# Patient Record
Sex: FEMALE | Race: WHITE | NOT HISPANIC OR LATINO | Employment: OTHER | URBAN - METROPOLITAN AREA
[De-identification: names, ages, dates, MRNs, and addresses within clinical notes are randomized per-mention and may not be internally consistent; named-entity substitution may affect disease eponyms.]

---

## 2023-10-23 ENCOUNTER — OFFICE VISIT (OUTPATIENT)
Dept: PRIMARY CARE | Facility: CLINIC | Age: 68
End: 2023-10-23
Payer: MEDICARE

## 2023-10-23 VITALS
HEART RATE: 103 BPM | TEMPERATURE: 98.6 F | SYSTOLIC BLOOD PRESSURE: 132 MMHG | BODY MASS INDEX: 22.4 KG/M2 | WEIGHT: 143 LBS | OXYGEN SATURATION: 96 % | DIASTOLIC BLOOD PRESSURE: 84 MMHG

## 2023-10-23 DIAGNOSIS — N63.21 MASS OF UPPER OUTER QUADRANT OF LEFT BREAST: Primary | ICD-10-CM

## 2023-10-23 PROBLEM — E04.1 THYROID NODULE: Status: ACTIVE | Noted: 2023-10-23

## 2023-10-23 PROBLEM — E06.0 THYROIDITIS, ACUTE: Status: ACTIVE | Noted: 2023-10-23

## 2023-10-23 PROBLEM — R73.03 PREDIABETES: Status: ACTIVE | Noted: 2023-10-23

## 2023-10-23 PROBLEM — E05.90 HYPERTHYROIDISM, SUBCLINICAL: Status: ACTIVE | Noted: 2023-10-23

## 2023-10-23 PROBLEM — R00.2 PALPITATIONS: Status: ACTIVE | Noted: 2023-10-23

## 2023-10-23 PROBLEM — E78.5 HLD (HYPERLIPIDEMIA): Status: ACTIVE | Noted: 2023-10-23

## 2023-10-23 PROCEDURE — 99213 OFFICE O/P EST LOW 20 MIN: CPT | Performed by: STUDENT IN AN ORGANIZED HEALTH CARE EDUCATION/TRAINING PROGRAM

## 2023-10-23 PROCEDURE — 1159F MED LIST DOCD IN RCRD: CPT | Performed by: STUDENT IN AN ORGANIZED HEALTH CARE EDUCATION/TRAINING PROGRAM

## 2023-10-23 PROCEDURE — 1036F TOBACCO NON-USER: CPT | Performed by: STUDENT IN AN ORGANIZED HEALTH CARE EDUCATION/TRAINING PROGRAM

## 2023-10-23 NOTE — PROGRESS NOTES
Subjective   Patient ID: Sharyn Link is a 67 y.o. female who presents for Breast Problem (LEFT BREAST LUMP).  Today she is accompanied by alone.     HPI  Left breast mass  Patient endorses finding a mass in the upper outer quadrant of her left breast about a week ago.    She states she does breast exams intermittently but not regularly.  She is a positive family history of breast cancer of which her mom passed at the age of 50.  States she was better with mammograms in her 50s but has not had them recently.  The mass is not associated with any pain no.  No current outpatient medications on file prior to visit.     No current facility-administered medications on file prior to visit.        Allergies   Allergen Reactions    Sulfamethoxazole Other       Immunization History   Administered Date(s) Administered    Moderna SARS-CoV-2 Vaccination 03/20/2021, 04/20/2021         Review of Systems  All pertinent positive symptoms are included in the history of present illness.  All other systems have been reviewed and are negative and noncontributory to this patient's current ailments.     Objective   /84   Pulse 103   Temp 37 °C (98.6 °F)   Wt 64.9 kg (143 lb)   SpO2 96%   BMI 22.40 kg/m²   BSA: 1.75 meters squared  No visits with results within 1 Month(s) from this visit.   Latest known visit with results is:   Legacy Encounter on 08/31/2022   Component Date Value Ref Range Status    TSH 08/31/2022 0.68  0.44 - 3.98 mIU/L Final    Comment:  TSH testing is performed using different testing    methodology at [facility] than at other    [facility]. Direct result comparisons should    only be made within the same method.         Physical Exam  CONSTITUTIONAL - well nourished, well developed, looks like stated age, in no acute distress, not ill-appearing, and not tired appearing  SKIN - normal skin color and pigmentation, normal skin turgor without rash, lesions, or nodules visualized  HEAD - no  trauma, normocephalic  EYES - normal external exam  CHEST -no distressed breathing, good effort; rubbery approximately 2 cm lump in the upper outer quadrant of the left breast that is movable and nontender.  EXTREMITIES - no edema, no deformities  NEUROLOGICAL - normal balance, normal motor, no ataxia  PSYCHIATRIC - alert, pleasant and cordial, age-appropriate      Assessment/Plan     Left breast mass  Requisition for bilateral mammogram was ordered  An ultrasound of the left breast was also ordered  We will reach out to you with results as they become available.    Thank you for letting us be a part of your care team.  Please call the office if you have further questions or concerns regarding your care

## 2023-11-08 ENCOUNTER — APPOINTMENT (OUTPATIENT)
Dept: RADIOLOGY | Facility: HOSPITAL | Age: 68
End: 2023-11-08
Payer: MEDICARE

## 2023-11-09 ENCOUNTER — ANCILLARY PROCEDURE (OUTPATIENT)
Dept: RADIOLOGY | Facility: HOSPITAL | Age: 68
End: 2023-11-09
Payer: MEDICARE

## 2023-11-09 ENCOUNTER — APPOINTMENT (OUTPATIENT)
Dept: RADIOLOGY | Facility: HOSPITAL | Age: 68
End: 2023-11-09
Payer: MEDICARE

## 2023-11-09 ENCOUNTER — HOSPITAL ENCOUNTER (OUTPATIENT)
Dept: RADIOLOGY | Facility: HOSPITAL | Age: 68
Discharge: HOME | End: 2023-11-09
Payer: MEDICARE

## 2023-11-09 DIAGNOSIS — N63.21 MASS OF UPPER OUTER QUADRANT OF LEFT BREAST: ICD-10-CM

## 2023-11-09 PROCEDURE — 77066 DX MAMMO INCL CAD BI: CPT | Mod: BILATERAL PROCEDURE | Performed by: STUDENT IN AN ORGANIZED HEALTH CARE EDUCATION/TRAINING PROGRAM

## 2023-11-09 PROCEDURE — G0279 TOMOSYNTHESIS, MAMMO: HCPCS | Mod: BILATERAL PROCEDURE | Performed by: STUDENT IN AN ORGANIZED HEALTH CARE EDUCATION/TRAINING PROGRAM

## 2023-11-09 PROCEDURE — 76642 ULTRASOUND BREAST LIMITED: CPT | Mod: 50,59

## 2023-11-09 PROCEDURE — 76983 USE EA ADDL TARGET LESION: CPT

## 2023-11-09 PROCEDURE — 77062 BREAST TOMOSYNTHESIS BI: CPT

## 2023-11-09 PROCEDURE — 76642 ULTRASOUND BREAST LIMITED: CPT | Mod: BILATERAL PROCEDURE | Performed by: STUDENT IN AN ORGANIZED HEALTH CARE EDUCATION/TRAINING PROGRAM

## 2024-08-20 ENCOUNTER — APPOINTMENT (OUTPATIENT)
Dept: PRIMARY CARE | Facility: CLINIC | Age: 69
End: 2024-08-20
Payer: MEDICARE

## 2024-08-20 VITALS
HEART RATE: 82 BPM | TEMPERATURE: 97.9 F | WEIGHT: 148 LBS | DIASTOLIC BLOOD PRESSURE: 80 MMHG | BODY MASS INDEX: 22.43 KG/M2 | SYSTOLIC BLOOD PRESSURE: 136 MMHG | HEIGHT: 68 IN

## 2024-08-20 DIAGNOSIS — Z12.31 BREAST CANCER SCREENING BY MAMMOGRAM: ICD-10-CM

## 2024-08-20 DIAGNOSIS — R00.2 PALPITATIONS: ICD-10-CM

## 2024-08-20 DIAGNOSIS — R92.8 ABNORMAL MAMMOGRAM: ICD-10-CM

## 2024-08-20 DIAGNOSIS — Z00.00 MEDICARE ANNUAL WELLNESS VISIT, SUBSEQUENT: Primary | ICD-10-CM

## 2024-08-20 PROCEDURE — 1170F FXNL STATUS ASSESSED: CPT | Performed by: STUDENT IN AN ORGANIZED HEALTH CARE EDUCATION/TRAINING PROGRAM

## 2024-08-20 PROCEDURE — 1159F MED LIST DOCD IN RCRD: CPT | Performed by: STUDENT IN AN ORGANIZED HEALTH CARE EDUCATION/TRAINING PROGRAM

## 2024-08-20 PROCEDURE — 1036F TOBACCO NON-USER: CPT | Performed by: STUDENT IN AN ORGANIZED HEALTH CARE EDUCATION/TRAINING PROGRAM

## 2024-08-20 PROCEDURE — G0439 PPPS, SUBSEQ VISIT: HCPCS | Performed by: STUDENT IN AN ORGANIZED HEALTH CARE EDUCATION/TRAINING PROGRAM

## 2024-08-20 PROCEDURE — 3008F BODY MASS INDEX DOCD: CPT | Performed by: STUDENT IN AN ORGANIZED HEALTH CARE EDUCATION/TRAINING PROGRAM

## 2024-08-20 ASSESSMENT — ENCOUNTER SYMPTOMS
HEADACHES: 0
FATIGUE: 0
ACTIVITY CHANGE: 0
CONFUSION: 0
DIFFICULTY URINATING: 0
LIGHT-HEADEDNESS: 0
SINUS PAIN: 0
DECREASED CONCENTRATION: 0
FLANK PAIN: 0
CHEST TIGHTNESS: 0
WEAKNESS: 0
TROUBLE SWALLOWING: 0
SHORTNESS OF BREATH: 0
ARTHRALGIAS: 0
ABDOMINAL PAIN: 0
PALPITATIONS: 1
EYE PAIN: 0
DIARRHEA: 0
FEVER: 0
AGITATION: 0
ABDOMINAL DISTENTION: 0
APPETITE CHANGE: 0
WHEEZING: 0
COLOR CHANGE: 0
CONSTIPATION: 0

## 2024-08-20 ASSESSMENT — ACTIVITIES OF DAILY LIVING (ADL)
DRESSING: INDEPENDENT
GROCERY_SHOPPING: INDEPENDENT
MANAGING_FINANCES: INDEPENDENT
DOING_HOUSEWORK: INDEPENDENT
TAKING_MEDICATION: INDEPENDENT
BATHING: INDEPENDENT

## 2024-08-20 ASSESSMENT — PATIENT HEALTH QUESTIONNAIRE - PHQ9
2. FEELING DOWN, DEPRESSED OR HOPELESS: NOT AT ALL
SUM OF ALL RESPONSES TO PHQ9 QUESTIONS 1 AND 2: 0
1. LITTLE INTEREST OR PLEASURE IN DOING THINGS: NOT AT ALL

## 2024-08-20 NOTE — PROGRESS NOTES
Subjective   Reason for Visit: Sharyn Link is an 68 y.o. female here for a Medicare Wellness visit.      Reviewed all medications by prescribing practitioner or clinical pharmacist (such as prescriptions, OTCs, herbal therapies and supplements) and documented in the medical record.    Here for Medicare annual. Patient wants to get labs done because it has been a long time since the last ones were done.    Patient describes a history of occasional heart palpitations in the past that were worked up by Cardiology. They followed with Holter Monitors and recommended metoprolol at that time. Patient denied it working well for her. Follow up was lost, but patient would be agreeable to going back to them. Is not currently on any medications.    Mammogram recommended for follow up from last one in 2023.  Last colonoscopy: 12 years ago, declines further screening at this time  Last pap: At 60 years old and was normal     Immunizations: Got Tdap in 1/2023.  Due for shingles and pneumonia vaccination. Patient was made aware about pharmacy availability.          Patient Care Team:  Phil Huggins MD as PCP - General (Family Medicine)     Review of Systems   Constitutional:  Negative for activity change, appetite change, fatigue and fever.   HENT:  Negative for sinus pain and trouble swallowing.    Eyes:  Negative for pain.   Respiratory:  Negative for chest tightness, shortness of breath and wheezing.    Cardiovascular:  Positive for palpitations. Negative for chest pain and leg swelling.   Gastrointestinal:  Negative for abdominal distention, abdominal pain, constipation and diarrhea.   Endocrine: Negative for cold intolerance and heat intolerance.   Genitourinary:  Negative for difficulty urinating and flank pain.   Musculoskeletal:  Negative for arthralgias and gait problem.   Skin:  Negative for color change.   Neurological:  Negative for syncope, weakness, light-headedness and headaches.   Psychiatric/Behavioral:  Negative for  "agitation, confusion and decreased concentration.        Objective   Vitals:  /80 (BP Location: Right arm)   Pulse 82   Temp 36.6 °C (97.9 °F)   Ht 1.727 m (5' 8\")   Wt 67.1 kg (148 lb)   PF 96 L/min   BMI 22.50 kg/m²       Physical Exam  Constitutional:       General: She is not in acute distress.     Appearance: Normal appearance. She is normal weight. She is not ill-appearing.   HENT:      Head: Normocephalic and atraumatic.      Right Ear: External ear normal.      Left Ear: External ear normal.      Nose: No congestion or rhinorrhea.      Mouth/Throat:      Mouth: Mucous membranes are moist.   Eyes:      General:         Right eye: No discharge.         Left eye: No discharge.      Extraocular Movements: Extraocular movements intact.      Conjunctiva/sclera: Conjunctivae normal.   Cardiovascular:      Rate and Rhythm: Normal rate and regular rhythm.      Pulses: Normal pulses.      Heart sounds: Normal heart sounds.   Pulmonary:      Effort: Pulmonary effort is normal.      Breath sounds: Normal breath sounds.   Abdominal:      Palpations: Abdomen is soft.      Tenderness: There is no abdominal tenderness.   Musculoskeletal:      Cervical back: Normal range of motion.      Right lower leg: No edema.      Left lower leg: No edema.   Skin:     General: Skin is warm and dry.   Neurological:      General: No focal deficit present.      Mental Status: She is alert and oriented to person, place, and time. Mental status is at baseline.   Psychiatric:         Mood and Affect: Mood normal.         Behavior: Behavior normal.         Thought Content: Thought content normal.         Assessment/Plan   Problem List Items Addressed This Visit             ICD-10-CM    Palpitations R00.2    Relevant Orders    CBC and Auto Differential    Referral to Cardiology     Other Visit Diagnoses         Codes    Medicare annual wellness visit, subsequent    -  Primary Z00.00    Relevant Orders    CBC and Auto Differential    " Comprehensive metabolic panel    Lipid panel    Hemoglobin A1c    Breast cancer screening by mammogram     Z12.31    Abnormal mammogram     R92.8    Relevant Orders    BI mammo bilateral diagnostic tomosynthesis    BI US breast complete bilateral          Follow up in one year for annual well visit or sooner as needed.    Pardeep To DO, PGY1  Family Medicine

## 2024-09-11 ENCOUNTER — HOSPITAL ENCOUNTER (OUTPATIENT)
Dept: RADIOLOGY | Facility: HOSPITAL | Age: 69
Discharge: HOME | End: 2024-09-11
Payer: MEDICARE

## 2024-09-11 ENCOUNTER — LAB (OUTPATIENT)
Dept: LAB | Facility: LAB | Age: 69
End: 2024-09-11
Payer: MEDICARE

## 2024-09-11 VITALS — BODY MASS INDEX: 22.43 KG/M2 | WEIGHT: 148 LBS | HEIGHT: 68 IN

## 2024-09-11 DIAGNOSIS — R92.8 ABNORMAL MAMMOGRAM: ICD-10-CM

## 2024-09-11 DIAGNOSIS — R00.2 PALPITATIONS: ICD-10-CM

## 2024-09-11 DIAGNOSIS — Z00.00 MEDICARE ANNUAL WELLNESS VISIT, SUBSEQUENT: ICD-10-CM

## 2024-09-11 LAB
ALBUMIN SERPL BCP-MCNC: 4.6 G/DL (ref 3.4–5)
ALP SERPL-CCNC: 80 U/L (ref 33–136)
ALT SERPL W P-5'-P-CCNC: 12 U/L (ref 7–45)
ANION GAP SERPL CALC-SCNC: 12 MMOL/L (ref 10–20)
AST SERPL W P-5'-P-CCNC: 16 U/L (ref 9–39)
BASOPHILS # BLD AUTO: 0.05 X10*3/UL (ref 0–0.1)
BASOPHILS NFR BLD AUTO: 0.9 %
BILIRUB SERPL-MCNC: 0.5 MG/DL (ref 0–1.2)
BUN SERPL-MCNC: 11 MG/DL (ref 6–23)
CALCIUM SERPL-MCNC: 9.3 MG/DL (ref 8.6–10.3)
CHLORIDE SERPL-SCNC: 100 MMOL/L (ref 98–107)
CHOLEST SERPL-MCNC: 235 MG/DL (ref 0–199)
CHOLESTEROL/HDL RATIO: 3.1
CO2 SERPL-SCNC: 29 MMOL/L (ref 21–32)
CREAT SERPL-MCNC: 0.55 MG/DL (ref 0.5–1.05)
EGFRCR SERPLBLD CKD-EPI 2021: >90 ML/MIN/1.73M*2
EOSINOPHIL # BLD AUTO: 0.16 X10*3/UL (ref 0–0.7)
EOSINOPHIL NFR BLD AUTO: 3 %
ERYTHROCYTE [DISTWIDTH] IN BLOOD BY AUTOMATED COUNT: 12.9 % (ref 11.5–14.5)
EST. AVERAGE GLUCOSE BLD GHB EST-MCNC: 114 MG/DL
GLUCOSE SERPL-MCNC: 90 MG/DL (ref 74–99)
HBA1C MFR BLD: 5.6 %
HCT VFR BLD AUTO: 43.6 % (ref 36–46)
HDLC SERPL-MCNC: 76.4 MG/DL
HGB BLD-MCNC: 14.3 G/DL (ref 12–16)
IMM GRANULOCYTES # BLD AUTO: 0.01 X10*3/UL (ref 0–0.7)
IMM GRANULOCYTES NFR BLD AUTO: 0.2 % (ref 0–0.9)
LDLC SERPL CALC-MCNC: 148 MG/DL
LYMPHOCYTES # BLD AUTO: 2.24 X10*3/UL (ref 1.2–4.8)
LYMPHOCYTES NFR BLD AUTO: 41.6 %
MCH RBC QN AUTO: 30.6 PG (ref 26–34)
MCHC RBC AUTO-ENTMCNC: 32.8 G/DL (ref 32–36)
MCV RBC AUTO: 93 FL (ref 80–100)
MONOCYTES # BLD AUTO: 0.4 X10*3/UL (ref 0.1–1)
MONOCYTES NFR BLD AUTO: 7.4 %
NEUTROPHILS # BLD AUTO: 2.52 X10*3/UL (ref 1.2–7.7)
NEUTROPHILS NFR BLD AUTO: 46.9 %
NON HDL CHOLESTEROL: 159 MG/DL (ref 0–149)
NRBC BLD-RTO: 0 /100 WBCS (ref 0–0)
PLATELET # BLD AUTO: 248 X10*3/UL (ref 150–450)
POTASSIUM SERPL-SCNC: 4 MMOL/L (ref 3.5–5.3)
PROT SERPL-MCNC: 6.8 G/DL (ref 6.4–8.2)
RBC # BLD AUTO: 4.68 X10*6/UL (ref 4–5.2)
SODIUM SERPL-SCNC: 137 MMOL/L (ref 136–145)
TRIGL SERPL-MCNC: 52 MG/DL (ref 0–149)
VLDL: 10 MG/DL (ref 0–40)
WBC # BLD AUTO: 5.4 X10*3/UL (ref 4.4–11.3)

## 2024-09-11 PROCEDURE — G0279 TOMOSYNTHESIS, MAMMO: HCPCS | Performed by: RADIOLOGY

## 2024-09-11 PROCEDURE — 36415 COLL VENOUS BLD VENIPUNCTURE: CPT

## 2024-09-11 PROCEDURE — 77062 BREAST TOMOSYNTHESIS BI: CPT

## 2024-09-11 PROCEDURE — 77066 DX MAMMO INCL CAD BI: CPT | Performed by: RADIOLOGY

## 2024-10-06 NOTE — PROGRESS NOTES
Subjective   Sharyn Link is a 68 y.o. female who presents TO ESTABLISH PCP as a TRANSFER OF CARE FROM DR. TONEY for  FOLLOW UP VISIT TO DISCUSS and REVIEW TEST RESULTS.    HPI:      68 y.o. female who presents TO ESTABLISH PCP as a TRANSFER OF CARE FROM DR. TONEY for   for  FOLLOW UP VISIT TO DISCUSS and REVIEW TEST RESULTS.     EMR/EPIC records reviewed.    Last saw Dr. Toney 8/20/24 for Medicare Wellness Visit.     PMHx:  HLD (hyperlipidemia)  Hyperthyroidism, subclinical  Palpitations; saw cardiologist in 2021  Prediabetes  Thyroid nodules      Thyroid US (6/17/24)  IMPRESSION:    Thyroid nodule(s) is/are present.  Surveillance imaging is recommended  for one or more nodules as detailed in the synoptic report.    TI-RADS Category: TR4    ACR Recommendation: TI-RADS 4 nodule.  Follow up imaging in 1, 2, 3 and 5  years is advised.    ACR recommendations are strictly based on the size and imaging appearance  at the time of the exam and do not consider stability or previous biopsy  results.            : KENDELL    Transcribe Date/Time: Jun 20 2024  2:38P    Dictated by : PRABHA QUIROZ MD    This examination was interpreted and the report reviewed and  electronically signed by:  PRABHA QUIROZ MD on Jun 20 2024  2:43PM  EST  Narrative    * * *Final Report* * *    DATE OF EXAM: Jun 17 2024  9:58AM      U   1048  -  US THYROID/PARATHYROID  / ACCESSION #  [id number]    PROCEDURE REASON: Multiple thyroid nodules        * * * * Physician Interpretation * * * *     EXAMINATION:  THYROID ULTRASOUND    CLINICAL HISTORY: Multiple thyroid nodules    TECHNIQUE:  Sonography and Doppler imaging of the thyroid was performed.    Images were obtained and stored in a permanent archive.    MQ:  UST_1  COMPARISON: None.    RESULT:    Right Lobe:  5.5 x 1.9 x 1.6 cm; homogeneous echogenicity, expected  vascular flow.    Left Lobe:  5.5 x 1.6 x 1.4 cm; homogeneous echogenicity, expected  vascular flow.    Isthmus: 0.3  cm    The most suspicious thyroid nodule(s) (up to four) as below:    NODULE 1:  Location: Right mid  Size: 0.7 x 0.7 x 0.4 cm  Characteristics:       Composition:  Solid or almost completely solid, 2 points       Echogenicity: Hypoechoic, 2 points       Shape:  Wider-than-tall, 0 points       Margin: Smooth, 0 points       Echogenic foci (add points for all that apply):  None, 0 points  TI-RADS Category: TR4  ACR Recommendation: TI-RADS 4 nodule.  No FNA or follow-up imaging is  advised.    NODULE 2:  Location: Right lower pole  Size: 1.3 x 1.0 x 0.8 cm  Characteristics:       Composition:  Solid or almost completely solid, 2 points       Echogenicity: Isoechoic, 1 point       Shape:  Wider-than-tall, 0 points       Margin: Extra-thyroidal extension, 3 points       Echogenic foci (add points for all that apply):  None, 0 points  TI-RADS Category: TR4  ACR Recommendation: TI-RADS 4 nodule.  Follow up imaging in 1, 2, 3 and 5  years is advised.    NODULE 3:  Location: Right lower pole  Size: 0.7 x 0.5 x 0.3 cm  Characteristics:       Composition:  Solid or almost completely solid, 2 points       Echogenicity: Hypoechoic, 2 points       Shape:  Wider-than-tall, 0 points       Margin: Smooth, 0 points       Echogenic foci (add points for all that apply):  None, 0 points  TI-RADS Category: TR4  ACR Recommendation: TI-RADS 4 nodule.  No FNA or follow-up imaging is  advised.    NODULE 4:  Location: Left mid  Size: 0.7 x 0.4 x 0.3 cm  Characteristics:       Composition:  Solid or almost completely solid, 2 points       Echogenicity: Hypoechoic, 2 points       Shape:  Wider-than-tall, 0 points       Margin: Smooth, 0 points       Echogenic foci (add points for all that apply):  None, 0 points  TI-RADS Category: TR4  ACR Recommendation: TI-RADS 4 nodule.  No FNA or follow-up imaging is  advised.       Healthcare Providers:  Cardiology: none  [facility]inology: Precious Handley MD ; follows for thyroid nodules  Prior  PCP: Dr. Huggins       Preventive Health Services:  -Last Medicare wellness visit: 8/20/24==> NEXT DUE 8/2025  -Last colonoscopy: 12 years ago, declines further screening at this time  -Last pap: At 60 years old and was normal, patient declines further screenings   -last mammogram: 9/11/24==> NEXT DUE 9/11/25  -last STI screening: ?; patient declined  -Hep C screening: ?; patient declined  -DEXA: NOW DUE  -CT Heart Ca scoring: NOW DUE           Immunizations:  -Childhood vaccines: completed per patient   -updated COVID spike vaccine: NOW DUE  -TDAP: 1/2024  -shingles: NOW DUE  -Prevnar 20: NOW DUE  -Flu vaccine: DUE NOW    Immunization History   Administered Date(s) Administered    Moderna SARS-CoV-2 Vaccination 03/20/2021, 04/20/2021       INTERVAL HISTORY since last visit with Dr. Huggins       Mammogram (9/11/24):  Interpreted By: Dinorah Daily   IMPRESSION:  Stable focal areas of architectural distortion within the right  breast similar to previous exam. Findings are probably benign. As a  precaution, additional 12 month follow-up mammogram recommended to  document continued stability.      -completed labs. Based on test results:    -HgBA1c normal    -elevated total cholesterol, LDL, and non-HDL cholesterol: patient advised please follow a low cholesterol diet, regularly exercise, and limit alcohol intake and  follow up will discuss if a cholesterol medication is indicated.     -normal liver and kidney function          Today  Sharyn reports:     - doing and feeling well    -ongoing intermittent palpitations, rated in severity as /10, not worsening over time. She denies chest pain, cough, SOB, orthopnea, LE swelling or FHx heart disease or sudden cardiac death. She expressed interest in referral to cardiology and states that she previously was previously evaluated by a cardiologist for heart palpitations.     -not taking any medications       Today  she has no other reported complaints, issues, or problems.  BINTA is  "NEG for HEADACHE, NAUSEA, VOMITING, DIARRHEA, CHEST PAIN, SOB, and BLEEDING.   Review of systems (12) is negative for all systems except for any identified issues in HPI above.       Objective     /84   Pulse 102   Temp 37.2 °C (98.9 °F)   Ht 1.727 m (5' 8\")   Wt 65.8 kg (145 lb)   SpO2 99%   BMI 22.05 kg/m²      Physical Examination:       GENERAL           General Appearance: well-appearing, well-developed, well-hydrated, well-nourished, no acute distress.        HEENT           NECK supple, no masses or thyromegaly, no carotid bruit.        EYES           Extraocular Movements: normal, bilateral eyes HOWIE, no conjunctival injection.        HEART           Rate and Rhythm regular rate and rhythm. Heart sounds: normal S1S2, no S3 or S4. Murmurs: none.        CHEST           Breath sounds: Clear to IPPA, RR<16 no use of accessory muscles.        ABDOMEN           General: Neg for LKKS or masses, no scleral icterus or jaundice.        MUSCULOSKELETAL           Joints Demonstration: Neg for erythema, swelling or joint deformities. gross abnormalities no gross abnormalities.        EXTREMITIES           Lower Extremities: Neg for cyanosis, clubbing or edema.       Assessment/Plan   Problem List Items Addressed This Visit       HLD (hyperlipidemia)     Other Visit Diagnoses       Heart palpitations    -  Primary    Relevant Orders    Referral to Cardiology    Multiple thyroid nodules        Vitamin D deficiency        Relevant Orders    Vitamin D 25-Hydroxy,Total (for eval of Vitamin D levels)    Encounter for hepatitis C screening test for low risk patient        Routine screening for STI (sexually transmitted infection)        Age related osteoporosis, unspecified pathological fracture presence        Relevant Orders    XR DEXA bone density    Immunization counseling        Encounter for screening for coronary artery disease        Relevant Orders    CT cardiac scoring wo IV contrast    Encounter for " administration of vaccine        Relevant Orders    Flu vaccine, trivalent, preservative free, HIGH-DOSE, age 65y+ (Fluzone)          Heart palpitations: No red flag signs/sxs. r/o cardiac etiology  -referral to cardiology ordered  -Emergency Dept and 911/EMS precautions discussed and reviewed with patient    Hyperlipidemia and ASCVD risk score 10.5%  -recommend starting low dose statin; patient declined  -low cholesterol, low fat diet, regularly exercise, and limit alcohol intake    Thyroid nodules: TSH/T4 WNL 6/11/24. followed by [facility] endocrinology  -cont management by [facility] endocrinology  -thyroid US NEXT DUE 6/17/25       Vitamin D deficiency  -Vit D levels ordered     Hep C screening: patient DECLINED       STI Screening: patient DECLINED      Age related osteoporosis  -DEXA ordered    Heart Disease Screening:  -CT Heart Ca scoring ordered     Counseling:      Medication education:        Education:  The patient is counseled regarding potential side-effects of all new medications        Understanding:  Patient expressed understanding        Adherence:  No barriers to adherence identified        Immunizations Counseling  -flu vaccine and prevnar 20 now due==> high dose flu vaccine received today   -recommend updated COVID spike vaccine, shingles, and RSV that can be obtained at local pharmacy     FOLLOW-UP: 8-12 weeks     I have personally reviewed all available pertinent labs, imaging, and consult notes with the patient.      Discussed recommended plan of care with patient. Patient expressed understanding and agreement with plan of care. All of patient's questions were answered at the time. Patient had no additional questions at the time.       Milana Hernandez MD, PhD

## 2024-10-09 ENCOUNTER — APPOINTMENT (OUTPATIENT)
Dept: PRIMARY CARE | Facility: CLINIC | Age: 69
End: 2024-10-09
Payer: MEDICARE

## 2024-10-09 VITALS
OXYGEN SATURATION: 99 % | WEIGHT: 145 LBS | HEART RATE: 102 BPM | SYSTOLIC BLOOD PRESSURE: 132 MMHG | DIASTOLIC BLOOD PRESSURE: 84 MMHG | BODY MASS INDEX: 21.98 KG/M2 | HEIGHT: 68 IN | TEMPERATURE: 98.9 F

## 2024-10-09 DIAGNOSIS — R00.2 HEART PALPITATIONS: Primary | ICD-10-CM

## 2024-10-09 DIAGNOSIS — E78.2 MIXED HYPERLIPIDEMIA: ICD-10-CM

## 2024-10-09 DIAGNOSIS — E55.9 VITAMIN D DEFICIENCY: ICD-10-CM

## 2024-10-09 DIAGNOSIS — M81.0 AGE RELATED OSTEOPOROSIS, UNSPECIFIED PATHOLOGICAL FRACTURE PRESENCE: ICD-10-CM

## 2024-10-09 DIAGNOSIS — Z71.85 IMMUNIZATION COUNSELING: ICD-10-CM

## 2024-10-09 DIAGNOSIS — Z13.6 ENCOUNTER FOR SCREENING FOR CORONARY ARTERY DISEASE: ICD-10-CM

## 2024-10-09 DIAGNOSIS — Z11.3 ROUTINE SCREENING FOR STI (SEXUALLY TRANSMITTED INFECTION): ICD-10-CM

## 2024-10-09 DIAGNOSIS — E04.2 MULTIPLE THYROID NODULES: ICD-10-CM

## 2024-10-09 DIAGNOSIS — Z23 ENCOUNTER FOR ADMINISTRATION OF VACCINE: ICD-10-CM

## 2024-10-09 DIAGNOSIS — Z11.59 ENCOUNTER FOR HEPATITIS C SCREENING TEST FOR LOW RISK PATIENT: ICD-10-CM

## 2024-10-09 PROCEDURE — 3008F BODY MASS INDEX DOCD: CPT | Performed by: FAMILY MEDICINE

## 2024-10-09 PROCEDURE — 90662 IIV NO PRSV INCREASED AG IM: CPT | Performed by: FAMILY MEDICINE

## 2024-10-09 PROCEDURE — 99214 OFFICE O/P EST MOD 30 MIN: CPT | Performed by: FAMILY MEDICINE

## 2024-10-09 PROCEDURE — G2211 COMPLEX E/M VISIT ADD ON: HCPCS | Performed by: FAMILY MEDICINE

## 2024-10-09 PROCEDURE — 1159F MED LIST DOCD IN RCRD: CPT | Performed by: FAMILY MEDICINE

## 2024-10-09 PROCEDURE — 1036F TOBACCO NON-USER: CPT | Performed by: FAMILY MEDICINE

## 2024-10-09 PROCEDURE — G0008 ADMIN INFLUENZA VIRUS VAC: HCPCS | Performed by: FAMILY MEDICINE

## 2024-10-09 ASSESSMENT — PATIENT HEALTH QUESTIONNAIRE - PHQ9
SUM OF ALL RESPONSES TO PHQ9 QUESTIONS 1 AND 2: 0
1. LITTLE INTEREST OR PLEASURE IN DOING THINGS: NOT AT ALL
2. FEELING DOWN, DEPRESSED OR HOPELESS: NOT AT ALL

## 2024-10-11 ENCOUNTER — LAB (OUTPATIENT)
Dept: LAB | Facility: LAB | Age: 69
End: 2024-10-11
Payer: MEDICARE

## 2024-10-11 DIAGNOSIS — E55.9 VITAMIN D DEFICIENCY: ICD-10-CM

## 2024-10-11 LAB — 25(OH)D3 SERPL-MCNC: 27 NG/ML (ref 30–100)

## 2024-10-11 PROCEDURE — 82306 VITAMIN D 25 HYDROXY: CPT

## 2024-10-11 PROCEDURE — 36415 COLL VENOUS BLD VENIPUNCTURE: CPT

## 2024-12-20 ENCOUNTER — APPOINTMENT (OUTPATIENT)
Dept: PRIMARY CARE | Facility: CLINIC | Age: 69
End: 2024-12-20
Payer: MEDICARE

## 2024-12-31 ENCOUNTER — OFFICE VISIT (OUTPATIENT)
Dept: CARDIOLOGY | Facility: HOSPITAL | Age: 69
End: 2024-12-31
Payer: MEDICARE

## 2024-12-31 VITALS
BODY MASS INDEX: 21.99 KG/M2 | WEIGHT: 144.62 LBS | OXYGEN SATURATION: 97 % | SYSTOLIC BLOOD PRESSURE: 154 MMHG | HEART RATE: 87 BPM | DIASTOLIC BLOOD PRESSURE: 94 MMHG

## 2024-12-31 DIAGNOSIS — I49.9 CARDIAC ARRHYTHMIA, UNSPECIFIED CARDIAC ARRHYTHMIA TYPE: ICD-10-CM

## 2024-12-31 DIAGNOSIS — R00.2 HEART PALPITATIONS: Primary | ICD-10-CM

## 2024-12-31 LAB
ATRIAL RATE: 90 BPM
P AXIS: 76 DEGREES
P OFFSET: 202 MS
P ONSET: 150 MS
PR INTERVAL: 154 MS
Q ONSET: 227 MS
QRS COUNT: 14 BEATS
QRS DURATION: 74 MS
QT INTERVAL: 356 MS
QTC CALCULATION(BAZETT): 435 MS
QTC FREDERICIA: 407 MS
R AXIS: -5 DEGREES
T AXIS: 61 DEGREES
T OFFSET: 405 MS
VENTRICULAR RATE: 90 BPM

## 2024-12-31 PROCEDURE — 93005 ELECTROCARDIOGRAM TRACING: CPT | Performed by: STUDENT IN AN ORGANIZED HEALTH CARE EDUCATION/TRAINING PROGRAM

## 2024-12-31 PROCEDURE — 99214 OFFICE O/P EST MOD 30 MIN: CPT | Performed by: STUDENT IN AN ORGANIZED HEALTH CARE EDUCATION/TRAINING PROGRAM

## 2024-12-31 PROCEDURE — 1159F MED LIST DOCD IN RCRD: CPT | Performed by: STUDENT IN AN ORGANIZED HEALTH CARE EDUCATION/TRAINING PROGRAM

## 2024-12-31 PROCEDURE — 99204 OFFICE O/P NEW MOD 45 MIN: CPT | Performed by: STUDENT IN AN ORGANIZED HEALTH CARE EDUCATION/TRAINING PROGRAM

## 2024-12-31 PROCEDURE — 1036F TOBACCO NON-USER: CPT | Performed by: STUDENT IN AN ORGANIZED HEALTH CARE EDUCATION/TRAINING PROGRAM

## 2024-12-31 PROCEDURE — 93010 ELECTROCARDIOGRAM REPORT: CPT | Performed by: STUDENT IN AN ORGANIZED HEALTH CARE EDUCATION/TRAINING PROGRAM

## 2024-12-31 NOTE — PROGRESS NOTES
Primary Care Physician: Milana Hernandez MD PhD   Date of Visit: 12/31/2024  2:20 PM EST  Type of Visit: new      Chief Complaint:  No chief complaint on file.       HPI  Sharyn Link 69 y.o. female with hx of HLD referred for palpitations    She had seen Krista Lopez CNP 2 years ago   She has intermittent palpitations, daily basis, not worsening   No dizziness or syncope  She started on metoprolol then she did stop it   No le edema  No chest pain or sob  No bleeding issues  She has stable thyroid nodules with normal thyroid function    Active     Her BP 120s range      Review of Systems   Review of Systems   12 points review of systems are negative expect for the above    Social History:  Social History     Socioeconomic History    Marital status:      Spouse name: Not on file    Number of children: Not on file    Years of education: Not on file    Highest education level: Not on file   Occupational History    Not on file   Tobacco Use    Smoking status: Never    Smokeless tobacco: Never   Vaping Use    Vaping status: Never Used   Substance and Sexual Activity    Alcohol use: Not on file    Drug use: Not on file    Sexual activity: Not on file   Other Topics Concern    Not on file   Social History Narrative    Not on file     Social Drivers of Health     Financial Resource Strain: Not on file   Food Insecurity: Not on file   Transportation Needs: Not on file   Physical Activity: Not on file   Stress: Not on file   Social Connections: Not on file   Intimate Partner Violence: Not on file   Housing Stability: Not on file        Past Medical History:  No past medical history on file.    Past Surgical History:  No past surgical history on file.    Family History:  Family History   Problem Relation Name Age of Onset    Breast cancer Mother          Objective:       6/24/2022     9:24 AM 7/15/2022    12:55 PM 10/23/2023     1:23 PM 8/20/2024     7:57 AM 8/20/2024     8:21 AM 9/11/2024     8:38 AM 10/9/2024      "1:45 PM   Vitals   Systolic 143 136 132 142 136  132   Diastolic 76 80 84 80 80  84   BP Location     Right arm     Heart Rate 86 61 103 82   102   Temp   37 °C (98.6 °F) 36.6 °C (97.9 °F)   37.2 °C (98.9 °F)   Height 1.702 m (5' 7\") 1.702 m (5' 7\")  1.727 m (5' 8\")  1.727 m (5' 8\") 1.727 m (5' 8\")   Weight (lb) 144.4 144.5 143 148  148 145   BMI 22.62 kg/m2 22.63 kg/m2 22.4 kg/m2 22.5 kg/m2  22.5 kg/m2 22.05 kg/m2   BSA (m2) 1.76 m2 1.76 m2 1.75 m2 1.79 m2  1.79 m2 1.78 m2   Visit Report   Report Report Report  Report      Constitutional:       Appearance: Healthy appearance. Not in distress.   Neck:      Vascular: No JVR. JVD normal.   Pulmonary:      Effort: Pulmonary effort is normal.      Breath sounds: Normal breath sounds. No wheezing. No rhonchi. No rales.   Chest:      Chest wall: Not tender to palpatation.   Cardiovascular:      Normal rate. Regular rhythm. Normal S1. Normal S2.       Murmurs: There is no murmur.      No gallop.  N No rub.   Pulses:     Intact distal pulses.   Edema:     Peripheral edema absent.   Abdominal:      General: Bowel sounds are normal.      Palpations: Abdomen is soft.      Tenderness: There is no abdominal tenderness.   Musculoskeletal: Normal range of motion.         General: No tenderness.   Skin:     General: Skin is warm and dry.   Neurological:      General: No focal deficit present.      Mental Status: Alert and oriented to person, place and time.     Allergies:  Allergies   Allergen Reactions    Sulfamethoxazole Other       Medications:  No current outpatient medications     Labs and Imaging:     Lab Results   Component Value Date    WBC 5.4 09/11/2024    HGB 14.3 09/11/2024    HCT 43.6 09/11/2024     09/11/2024    CHOL 235 (H) 09/11/2024    TRIG 52 09/11/2024    HDL 76.4 09/11/2024    ALT 12 09/11/2024    AST 16 09/11/2024     09/11/2024    K 4.0 09/11/2024     09/11/2024    CREATININE 0.55 09/11/2024    BUN 11 09/11/2024    CO2 29 09/11/2024    TSH " 0.68 08/31/2022    HGBA1C 5.6 09/11/2024         Echocardiogram: No results found for this or any previous visit from the past 1825 days.    Stress Testing: No results found for this or any previous visit from the past 1825 days.    Cardiac Catheterization: No results found for this or any previous visit from the past 1825 days.    Cardiac Scoring: No results found for this or any previous visit from the past 1825 days.    AAA : No results found for this or any previous visit from the past 1825 days.    OTHER: No results found for this or any previous visit from the past 1825 days.      The 10-year ASCVD risk score (Dae SCOTT, et al., 2019) is: 11.1%    Values used to calculate the score:      Age: 69 years      Sex: Female      Is Non- : No      Diabetic: No      Tobacco smoker: No      Systolic Blood Pressure: 132 mmHg      Is BP treated: No      HDL Cholesterol: 76.4 mg/dL      Total Cholesterol: 235 mg/dL     Assessment/Plan:   1. Heart palpitations  Referral to Cardiology         Sharyn Link 69 y.o. female with hx of HLD referred for palpitations    Plan   Will get an echo   Will get a holter    Will call with results   Consider moderate intensity statin, she will control her diet and will talk to her PCP about that    Time Spent: I spent  minutes reviewing medical testing, obtaining medical history and counselling and educating on diagnosis and documenting clinical encounter.         ____________________________________________________________  Yanique Kessler MD   of Medicine  Division of Cardiovascular Medicine   [facility] [place]  [facility]

## 2025-01-06 ENCOUNTER — HOSPITAL ENCOUNTER (OUTPATIENT)
Dept: CARDIOLOGY | Facility: CLINIC | Age: 70
Discharge: HOME | End: 2025-01-06
Payer: MEDICARE

## 2025-01-06 DIAGNOSIS — R00.2 HEART PALPITATIONS: ICD-10-CM

## 2025-01-06 DIAGNOSIS — I49.9 CARDIAC ARRHYTHMIA, UNSPECIFIED CARDIAC ARRHYTHMIA TYPE: ICD-10-CM

## 2025-01-06 PROCEDURE — 93225 XTRNL ECG REC<48 HRS REC: CPT

## 2025-01-07 ENCOUNTER — HOSPITAL ENCOUNTER (OUTPATIENT)
Dept: CARDIOLOGY | Facility: CLINIC | Age: 70
Discharge: HOME | End: 2025-01-07
Payer: MEDICARE

## 2025-01-07 DIAGNOSIS — R00.2 HEART PALPITATIONS: ICD-10-CM

## 2025-01-07 DIAGNOSIS — I49.9 CARDIAC ARRHYTHMIA, UNSPECIFIED CARDIAC ARRHYTHMIA TYPE: ICD-10-CM

## 2025-01-07 LAB
AORTIC VALVE PEAK VELOCITY: 1.54 M/S
AV PEAK GRADIENT: 9 MMHG
AVA (PEAK VEL): 2.04 CM2
EJECTION FRACTION APICAL 4 CHAMBER: 60
EJECTION FRACTION: 63 %
LEFT ATRIUM VOLUME AREA LENGTH INDEX BSA: 26.3 ML/M2
LEFT VENTRICLE INTERNAL DIMENSION DIASTOLE: 4.2 CM (ref 3.5–6)
LEFT VENTRICULAR OUTFLOW TRACT DIAMETER: 1.9 CM
MITRAL VALVE E/A RATIO: 0.88
MITRAL VALVE E/E' RATIO: 7.8
RIGHT VENTRICLE FREE WALL PEAK S': 14.4 CM/S
RIGHT VENTRICLE PEAK SYSTOLIC PRESSURE: 16.7 MMHG
TRICUSPID ANNULAR PLANE SYSTOLIC EXCURSION: 2.5 CM

## 2025-01-07 PROCEDURE — 93306 TTE W/DOPPLER COMPLETE: CPT | Performed by: STUDENT IN AN ORGANIZED HEALTH CARE EDUCATION/TRAINING PROGRAM

## 2025-01-07 PROCEDURE — 93306 TTE W/DOPPLER COMPLETE: CPT

## 2025-07-26 ENCOUNTER — OFFICE VISIT (OUTPATIENT)
Dept: URGENT CARE | Age: 70
End: 2025-07-26
Payer: MEDICARE

## 2025-07-26 VITALS
BODY MASS INDEX: 21.9 KG/M2 | DIASTOLIC BLOOD PRESSURE: 82 MMHG | OXYGEN SATURATION: 98 % | RESPIRATION RATE: 16 BRPM | TEMPERATURE: 94.6 F | WEIGHT: 144 LBS | HEART RATE: 100 BPM | SYSTOLIC BLOOD PRESSURE: 142 MMHG

## 2025-07-26 DIAGNOSIS — N39.0 URINARY TRACT INFECTION IN FEMALE: Primary | ICD-10-CM

## 2025-07-26 LAB
POC APPEARANCE, URINE: ABNORMAL
POC BILIRUBIN, URINE: NEGATIVE
POC BLOOD, URINE: ABNORMAL
POC COLOR, URINE: ABNORMAL
POC GLUCOSE, URINE: NEGATIVE MG/DL
POC KETONES, URINE: NEGATIVE MG/DL
POC LEUKOCYTES, URINE: ABNORMAL
POC NITRITE,URINE: NEGATIVE
POC PH, URINE: 6 PH
POC PROTEIN, URINE: NEGATIVE MG/DL
POC SPECIFIC GRAVITY, URINE: 1.01
POC UROBILINOGEN, URINE: 0.2 EU/DL

## 2025-07-26 RX ORDER — CEPHALEXIN 500 MG/1
500 CAPSULE ORAL 2 TIMES DAILY
Qty: 14 CAPSULE | Refills: 0 | Status: SHIPPED | OUTPATIENT
Start: 2025-07-26 | End: 2025-08-02

## 2025-07-26 NOTE — PROGRESS NOTES
Subjective   Patient ID: Sharyn Link is a 69 y.o. female. They present today with a chief complaint of UTI (Patient has had frequency and pressure since last night ).    History of Present Illness  See MDM    Past Medical History  Allergies as of 07/26/2025 - Reviewed 07/26/2025   Allergen Reaction Noted    Sulfamethoxazole Other 10/23/2023       Prescriptions Prior to Admission[1]     Medical History[2]    Surgical History[3]     reports that she has never smoked. She has never used smokeless tobacco. She reports that she does not drink alcohol and does not use drugs.    Review of Systems  ROS is negative unless otherwise stated in HPI.         Objective    Vitals:    07/26/25 1004   BP: 142/82   BP Location: Left arm   Patient Position: Sitting   BP Cuff Size: Adult   Pulse: 100   Resp: 16   Temp: 34.8 °C (94.6 °F)   TempSrc: Oral   SpO2: 98%   Weight: 65.3 kg (144 lb)     No LMP recorded. Patient is postmenopausal.      VS: As documented in the triage note and EMR flowsheet from this visit was reviewed  General: Well appearing. No acute distress.   Eyes:  Extraocular movements grossly intact. No scleral icterus.   Head: Atraumatic. Normocephalic.     Neck: No meningismus. No gross masses. Full movement through range of motion  CV: Regular rhythm. No murmurs, rubs, gallops appreciated.   Resp: Clear to auscultation bilaterally. No respiratory distress.    GI: Nontender. Soft. No masses. No rebound, rigidity or guarding. No CVA tenderness   MSK: Symmetric muscle bulk. No gross step offs or deformities.  Skin: Warm, dry. No rashes  Neuro: CN II-VII intact. A&O x3. Speech fluent. Alert. Moving all extremities. Ambulates with normal gait  Psych: Appropriate mood and affect for situation      Point of Care Test & Imaging Results from this visit  Results for orders placed or performed in visit on 07/26/25   POCT UA Automated manually resulted   Result Value Ref Range    POC Color, Urine Light-Yellow Straw, Yellow,  Light-Yellow    POC Appearance, Urine Cloudy (A) Clear    POC Glucose, Urine NEGATIVE NEGATIVE mg/dl    POC Bilirubin, Urine NEGATIVE NEGATIVE    POC Ketones, Urine NEGATIVE NEGATIVE mg/dl    POC Specific Gravity, Urine 1.010 1.005 - 1.035    POC Blood, Urine SMALL (1+) (A) NEGATIVE    POC PH, Urine 6.0 No Reference Range Established PH    POC Protein, Urine NEGATIVE NEGATIVE mg/dl    POC Urobilinogen, Urine 0.2 0.2, 1.0 EU/DL    Poc Nitrite, Urine NEGATIVE NEGATIVE    POC Leukocytes, Urine LARGE (3+) (A) NEGATIVE      Imaging  No results found.    Cardiology, Vascular, and Other Imaging  No other imaging results found for the past 2 days      Diagnostic study results (if any) were reviewed by Betsy Del Castillo PA-C.    Assessment/Plan   Allergies, medications, history, and pertinent labs/EKGs/Imaging reviewed by Betsy Del Castillo PA-C.     Medical Decision Making  Patient is a 69-year-old female who presents with abdominal pressure and urinary frequency.  On examination, patient well-appearing.  Vitals are stable.  Abdominal examination is benign.  No CVA tenderness.  Urinalysis reveals large leukocyte esterases with small blood.  Will initiate on Keflex twice daily for the next 7 days.  Will send urine for culture.    Orders and Diagnoses  Diagnoses and all orders for this visit:  Urinary tract infection in female  -     POCT UA Automated manually resulted  -     Urine Culture  -     cephalexin (Keflex) 500 mg capsule; Take 1 capsule (500 mg) by mouth 2 times a day for 7 days.      Medical Admin Record      Patient disposition: Home    Electronically signed by Betsy Del Castillo PA-C  10:52 AM           [1] (Not in a hospital admission)   [2] No past medical history on file.  [3] No past surgical history on file.

## 2025-07-29 LAB — BACTERIA UR CULT: ABNORMAL

## 2025-10-20 ENCOUNTER — APPOINTMENT (OUTPATIENT)
Facility: CLINIC | Age: 70
End: 2025-10-20
Payer: MEDICARE